# Patient Record
Sex: FEMALE | Race: WHITE | NOT HISPANIC OR LATINO | Employment: OTHER | URBAN - METROPOLITAN AREA
[De-identification: names, ages, dates, MRNs, and addresses within clinical notes are randomized per-mention and may not be internally consistent; named-entity substitution may affect disease eponyms.]

---

## 2019-07-08 ENCOUNTER — TRANSFERRED RECORDS (OUTPATIENT)
Dept: HEALTH INFORMATION MANAGEMENT | Facility: CLINIC | Age: 69
End: 2019-07-08

## 2021-04-05 ENCOUNTER — TRANSFERRED RECORDS (OUTPATIENT)
Dept: HEALTH INFORMATION MANAGEMENT | Facility: CLINIC | Age: 71
End: 2021-04-05

## 2021-07-06 ENCOUNTER — TRANSFERRED RECORDS (OUTPATIENT)
Dept: HEALTH INFORMATION MANAGEMENT | Facility: CLINIC | Age: 71
End: 2021-07-06

## 2021-08-02 ENCOUNTER — MEDICAL CORRESPONDENCE (OUTPATIENT)
Dept: HEALTH INFORMATION MANAGEMENT | Facility: CLINIC | Age: 71
End: 2021-08-02

## 2021-08-05 ENCOUNTER — TRANSCRIBE ORDERS (OUTPATIENT)
Dept: OTHER | Age: 71
End: 2021-08-05

## 2021-08-05 DIAGNOSIS — T83.718A: Primary | ICD-10-CM

## 2021-08-06 NOTE — TELEPHONE ENCOUNTER
"  REFERRAL INFORMATION:    Referring Provider:  Dr. Law Majano    Referring Clinic:  [facility]     Reason for Visit/Diagnosis: Hiatal hernic, Mesh erosion        FUTURE VISIT INFORMATION:    Appointment Date: 8/13/2021    Appointment Time: 7:30 AM      NOTES RECORD STATUS  DETAILS   OFFICE NOTE from Referring Provider Care Everywhere 5/17/2021, 3/26/2021 Office visit with Dr. Majano   OFFICE NOTE from Other Specialists In process    HOSPITAL DISCHARGE SUMMARY/ ED VISITS  N/A    OPERATIVE REPORT In process    ENDOSCOPY (EGD)  Care Everywhere 7/6/2021, 4/5/2021, 3/11/15 ([place])    PERTINENT LABS Care Everywhere    PATHOLOGY REPORTS (RELATED) N/A    IMAGING (CT, MRI, US, XR)  N/A      8/12/2021 4:04pm Called and LVM for Pt. Called to inquire about previous hiatal repairs. -Bhao    8/14/2021 Received message from DENIS Pina:  \"This patient was seen by Dr. Ricardo Brown on 8/13.  He is requesting additional records related to this appointment and I am wondering if you are able to obtain them.  He is asking for the following records:   1.  Operative Reports (related to abdominal/hiatal hernia surery)   2.  Colored pictures from the EGD that was done on 7/2.\"    Sent request to [place] for the 4 op reports and EGD (7-6-21)    CJ  "

## 2021-08-13 ENCOUNTER — PRE VISIT (OUTPATIENT)
Dept: SURGERY | Facility: CLINIC | Age: 71
End: 2021-08-13

## 2021-08-13 ENCOUNTER — OFFICE VISIT (OUTPATIENT)
Dept: SURGERY | Facility: CLINIC | Age: 71
End: 2021-08-13
Payer: MEDICARE

## 2021-08-13 VITALS
HEART RATE: 100 BPM | SYSTOLIC BLOOD PRESSURE: 134 MMHG | DIASTOLIC BLOOD PRESSURE: 77 MMHG | WEIGHT: 176.5 LBS | OXYGEN SATURATION: 96 %

## 2021-08-13 DIAGNOSIS — T83.718A: Primary | ICD-10-CM

## 2021-08-13 PROCEDURE — 99204 OFFICE O/P NEW MOD 45 MIN: CPT | Performed by: SURGERY

## 2021-08-13 RX ORDER — ALBUTEROL SULFATE 90 UG/1
2 AEROSOL, METERED RESPIRATORY (INHALATION) EVERY 6 HOURS
COMMUNITY

## 2021-08-13 RX ORDER — DESMOPRESSIN ACETATE 0.2 MG/1
0.2 TABLET ORAL DAILY
COMMUNITY

## 2021-08-13 RX ORDER — LEVOTHYROXINE SODIUM 88 UG/1
88 TABLET ORAL DAILY
COMMUNITY

## 2021-08-13 ASSESSMENT — ENCOUNTER SYMPTOMS
WHEEZING: 0
HEMATURIA: 0
EXTREMITY NUMBNESS: 0
JOINT SWELLING: 0
POLYPHAGIA: 0
POOR WOUND HEALING: 0
FLANK PAIN: 0
BOWEL INCONTINENCE: 0
WEIGHT LOSS: 0
BACK PAIN: 0
FATIGUE: 0
COUGH: 0
NIGHT SWEATS: 0
SEIZURES: 0
HOARSE VOICE: 0
HEARTBURN: 0
POLYDIPSIA: 0
CLAUDICATION: 0
HYPERTENSION: 0
STIFFNESS: 0
NECK MASS: 0
NERVOUS/ANXIOUS: 0
LEG PAIN: 0
FEVER: 0
WEAKNESS: 0
SINUS PAIN: 0
SNORES LOUDLY: 0
TACHYCARDIA: 0
BLOOD IN STOOL: 0
TINGLING: 0
HYPOTENSION: 0
RECTAL BLEEDING: 0
HEMOPTYSIS: 0
SPEECH CHANGE: 0
DECREASED CONCENTRATION: 0
EYE REDNESS: 0
CONSTIPATION: 0
NAUSEA: 0
MUSCLE CRAMPS: 0
HEADACHES: 0
WEIGHT GAIN: 0
ABDOMINAL PAIN: 0
COUGH DISTURBING SLEEP: 0
DIZZINESS: 0
BRUISES/BLEEDS EASILY: 1
POSTURAL DYSPNEA: 0
SKIN CHANGES: 0
JAUNDICE: 0
TREMORS: 0
HALLUCINATIONS: 0
MEMORY LOSS: 0
BREAST MASS: 0
RECTAL PAIN: 0
ALTERED TEMPERATURE REGULATION: 0
DISTURBANCES IN COORDINATION: 0
EYE WATERING: 0
SHORTNESS OF BREATH: 0
DYSPNEA ON EXERTION: 0
LOSS OF CONSCIOUSNESS: 0
INCREASED ENERGY: 0
MUSCLE WEAKNESS: 0
ARTHRALGIAS: 0
DECREASED LIBIDO: 0
DIARRHEA: 0
LEG SWELLING: 0
TROUBLE SWALLOWING: 0
SORE THROAT: 0
PARALYSIS: 0
DIFFICULTY URINATING: 0
PANIC: 0
DECREASED APPETITE: 0
SINUS CONGESTION: 0
NAIL CHANGES: 0
VOMITING: 0
NUMBNESS: 0
SMELL DISTURBANCE: 0
MYALGIAS: 0
TASTE DISTURBANCE: 0
BREAST PAIN: 0
EXERCISE INTOLERANCE: 0
PALPITATIONS: 0
RESPIRATORY PAIN: 0
SLEEP DISTURBANCES DUE TO BREATHING: 0
INSOMNIA: 0
SYNCOPE: 0
BLOATING: 0
EYE PAIN: 0
SPUTUM PRODUCTION: 0
NECK PAIN: 0
HOT FLASHES: 0
EYE IRRITATION: 0
LIGHT-HEADEDNESS: 0
ORTHOPNEA: 0
DOUBLE VISION: 0
SWOLLEN GLANDS: 0
DYSURIA: 0
CHILLS: 0
DEPRESSION: 0

## 2021-08-13 ASSESSMENT — PAIN SCALES - GENERAL: PAINLEVEL: NO PAIN (0)

## 2021-08-13 NOTE — NURSING NOTE
Chief Complaint   Patient presents with     Consult     Consultation Hernia with abdominal pain       Vitals:    08/13/21 0708   BP: 134/77   BP Location: Left arm   Patient Position: Chair   Cuff Size: Adult Regular   Pulse: 100   SpO2: 96%   Weight: 80.1 kg (176 lb 8 oz)       There is no height or weight on file to calculate BMI.

## 2021-08-13 NOTE — PROGRESS NOTES
New General Surgery Consultation Note        Skylar Quinones  [id number]  1950 August 13, 2021     Requesting Provider: No ref. 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 She is largely asymptomatic from this, however, would like it addressed if possible. I explained that further surgery would be far too risky relative to th benefits. I explained if the mesh was at a focal area of narrowing in the esophagus/stomach that could hold a stent, allowing mesh to migrate may be an option. I also explained some of the mesh can be cut, but likely not enough to make an appreciable difference. We will obtain her op records and photos from EGD. I will discuss this case with my colleageu Cedric Marques to discuss if there is a feasible endoscopic procedure to offer her, or not. W will connect by phone or [facility] in a couple weeks.    Ricardo Brown MD    45 minutes spent on the date of the encounter doing chart review, history and exam, documentation and further activities per the note      HISTORY OF PRESENT ILLNESS:  I had the pleasure of seeing Ms Quinones from [place]. She is a 71F who presents with concern for mesh erosion from prior surgery. Her symptoms are chronic occasional cough. She is able to perform ADL, and stay nourished with a bland diet, and is overall doing well and highly functional.    She has undergone 4 foregut operations between 8250-7684 for which we do not have the records. These include Nissen/HHR  and revisions. Her most recent surgery utilized permanent mesh. She has had intermittent EGD over the last decade. She has healed an ulcer, and is on omeprazole 20mg daily without classic reflux symptoms. However, on her most recent endoscopy, mesh erosion with tacks was noted, which is understandably concerning to Ms Quinones. She presents for an opinion on options to address this.      SEVERITY:   Severity of Present Illness Reviewed With Patient 8/13/2021   How would you describe your symptoms? Severe   Does your current concern alter your level of activities? Yes       TIMING:  Timing of Present Illness Reviewed With Patient 8/13/2021   The onset of my symptoms was: Sudden   My symptoms are: Intermittent (come and go)   My symptoms have been: Improving       DURATION:  No flowsheet data found.     MODIFYING FACTORS:  Modifying Factors Reviewed With Patient 8/13/2021   My symptoms get worse with: coughing eatting   My symptoms improve or resolve with: when using her inhalers           Review of Systems     Constitutional:  Negative for fever, chills, weight loss, weight gain, fatigue, decreased appetite, night sweats, recent stressors, height gain, height loss, post-operative complications, incisional pain, hallucinations, increased energy, hyperactivity and confused.   HENT:  Negative for ear pain, hearing loss, tinnitus, nosebleeds, trouble swallowing, hoarse voice, mouth sores, sore throat, ear discharge, tooth pain, gum tenderness, taste disturbance, smell disturbance, hearing aid, bleeding gums, dry mouth, sinus pain, sinus congestion and neck mass.    Eyes:  Negative for double vision, pain, redness, eye pain, decreased vision, eye watering, eye bulging, eye dryness, flashing lights, spots, floaters, strabismus, tunnel vision, jaundice and eye irritation.   Respiratory:   Negative for cough, hemoptysis, sputum production, shortness of breath, wheezing, sleep disturbances due to breathing, snores loudly,  respiratory pain, dyspnea on exertion, cough disturbing sleep and postural dyspnea.    Cardiovascular:  Negative for chest pain, dyspnea on exertion, palpitations, orthopnea, claudication, leg swelling, fingers/toes turn blue, hypertension, hypotension, syncope, history of heart murmur, chest pain on exertion, chest pain at rest, pacemaker, few scattered varicosities, leg pain, sleep disturbances due to breathing, tachycardia, light-headedness, exercise intolerance and edema.   Gastrointestinal:  Negative for heartburn, nausea, vomiting, abdominal pain, diarrhea, constipation, blood in stool, melena, rectal pain, bloating, hemorrhoids, bowel incontinence, jaundice, rectal bleeding, coffee ground emesis and change in stool.   Genitourinary:  Negative for bladder incontinence, dysuria, urgency, hematuria, flank pain, vaginal discharge, difficulty urinating, genital sores, dyspareunia, decreased libido, nocturia, voiding less frequently, arousal difficulty, abnormal vaginal bleeding, excessive menstruation, menstrual changes, hot flashes, vaginal dryness and postmenopausal bleeding.   Musculoskeletal:  Negative for myalgias, back pain, joint swelling, arthralgias, stiffness, muscle cramps, neck pain, bone pain, muscle weakness and fracture.   Skin:  Negative for nail changes, itching, poor wound healing, rash, hair changes, skin changes, acne, warts, poor wound healing, scarring, flaky skin, Raynaud's phenomenon, sensitivity to sunlight and skin thickening.   Neurological:  Negative for dizziness, tingling, tremors, speech change, seizures, loss of consciousness, weakness, light-headedness, numbness, headaches, disturbances in coordination, extremity numbness, memory loss, difficulty walking and paralysis.   Endo/Heme:  Positive for bruises/bleeds easily. Negative for anemia and swollen glands.   Psychiatric/Behavioral:  Negative for depression, hallucinations, memory loss, decreased concentration, mood swings and  panic attacks.    Breast:  Negative for breast discharge, breast mass, breast pain and nipple retraction.   Endocrine:  Negative for altered temperature regulation, polyphagia, polydipsia, unwanted hair growth and change in facial hair.       Patient Active Problem List   Diagnosis     Hyperlipemia     Hypothyroidism     Panhypopituitarism (HCC)     Diabetes insipidus (HCC)     Osteopenia     Rib pain on left side     Myofacial muscle pain     Post-thoracotomy pain syndrome     Adrenal insufficiency (HCC)     Syncope     Intercostal neuralgia     Abdominal pain, chronic, left upper quadrant     Primary osteoarthritis of left knee     Abnormal CT of the chest     Snoring     Nocturnal hypoxemia     Nasal septal perforation     Requires supplemental oxygen     Reactive airway disease     Immunization History   Administered Date(s) Administered     FLU VACCINE HIGH DOSE 65YR+(Fluzone) 10/25/2017     H1N1 Vaccine 12/16/2009     Influenza Vaccine,unspecified 09/23/2016, 09/18/2018     Pneumococcal Conj PCV13 06/22/2016     Pneumococcal Polysaccharide PPSV23 07/28/2014     TDAP 07/13/2010     Zoster Live(Zostavax) 07/16/2012     Zoster Recombinant (Shingrix) 12/10/2018, 03/14/2019     Past Medical History:   Diagnosis Date     Adrenal insufficiency (HCC)     Disorder of pituitary gland (HCC) 2006   surgery on pituitary gland resulting in cortisone deprivation     GERD (gastroesophageal reflux disease)     Hyperlipidemia     Nasal septal perforation 3/2/2019     Nocturnal hypoxemia     Osteoporosis     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)     Panhypopituitarism (HCC)     Primary osteoarthritis of left knee 4/6/2016     Thyroid disease     Past Surgical History:   Procedure Laterality Date     ARTHRODESIS MINOR Left 7/8/2019   Procedure: First metatarsal phalangeal joint fusion left foot;; Surgeon: Juan Torres DPM     BRAIN SURGERY   related to parathyroid surgery      BUNIONECTOMY Right 10/21/2019   Procedure: Distal first metatarsal osteotomy right foot;; Surgeon: Juan Torres DPM     COLONOSCOPY 08/08/2011     DISCISSION CATARACT LASER SURG     EYE PROCEDURE 09/20/2019   cataract     HERNIA REPAIR   4 hiatal hernia repairs     HYPOPHYSECTOMY EXC PITUITARY TUMR TRANSNASL TRANSSEPTL NONSTEREOTACTIC   Tumor removed from pituitary     RE HIATAL HERNIA LAP WO MES   x4     SKIN LESIONS Left   Removal from leg     TUBAL LIGATION 1981     UPPER ENDOSCOPY N/A 3/11/2015   Procedure: UPPER ENDOSCOPY;; Surgeon: Juan Donnelly MD     MEDICATIONS:  Current Outpatient Medications   Medication     albuterol (PROAIR HFA/PROVENTIL HFA/VENTOLIN HFA) 108 (90 Base) MCG/ACT inhaler     desmopressin (DDAVP) 0.2 MG tablet     fluticasone-salmeterol (ADVAIR) 100-50 MCG/DOSE inhaler     levothyroxine (SYNTHROID/LEVOTHROID) 88 MCG tablet     No current facility-administered medications for this visit.        ALLERGIES:  Allergies   Allergen Reactions     Demerol Hcl [Meperidine] Dizziness     Morphine Dizziness        SOCIAL HISTORY:  Social History     Socioeconomic History     Marital status:      Spouse name: Not on file     Number of children: Not on file     Years of education: Not on file     Highest education level: Not on file   Occupational History     Not on file   Tobacco Use     Smoking status: Not on file   Substance and Sexual Activity     Alcohol use: Not on file     Drug use: Not on file     Sexual activity: Not on file   Other Topics Concern     Not on file   Social History Narrative     Not on file     Social Determinants of Health     Financial Resource Strain:      Difficulty of Paying Living Expenses:    Food Insecurity:      Worried About Running Out of Food in the Last Year:      Ran Out of Food in the Last Year:    Transportation Needs:      Lack of Transportation (Medical):      Lack of Transportation (Non-Medical):    Physical Activity:      Days of Exercise per  Week:      Minutes of Exercise per Session:    Stress:      Feeling of Stress :    Social Connections:      Frequency of Communication with Friends and Family:      Frequency of Social Gatherings with Friends and Family:      Attends Gnosticist Services:      Active Member of Clubs or Organizations:      Attends Club or Organization Meetings:      Marital Status:    Intimate Partner Violence:      Fear of Current or Ex-Partner:      Emotionally Abused:      Physically Abused:      Sexually Abused:        FAMILY HISTORY:  No family history on file.    [facility]    PHYSICAL EXAM:  Objective    /77 (BP Location: Left arm, Patient Position: Chair, Cuff Size: Adult Regular)   Pulse 100   Wt 80.1 kg (176 lb 8 oz)   SpO2 96%   /77 (BP Location: Left arm, Patient Position: Chair, Cuff Size: Adult Regular)   Pulse 100   Wt 80.1 kg (176 lb 8 oz)   SpO2 96%   There is no height or weight on file to calculate BMI.    Well appearing, no acute distress        Sincerely,     Ricardo Brown MD

## 2021-08-13 NOTE — LETTER
8/13/2021       RE: Skylar Quinones  [address]  [place] [address]     Dear Colleague,    Thank you for referring your patient, Skylar Quinones, to the [facility] [place] [facility]. Please see a copy of my visit note below.    New General Surgery Consultation Note        Skylar Quinones  [id number]  1950 August 13, 2021     Requesting Provider: No ref. 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 She is largely asymptomatic from this, however, would like it addressed if possible. I explained that further surgery would be far too risky relative to th benefits. I explained if the mesh was at a focal area of narrowing in the esophagus/stomach that could hold a stent, allowing mesh to migrate may be an option. I also explained some of the mesh can be cut, but likely not enough to make an appreciable difference. We will obtain her op records and photos from EGD. I will discuss this case with my colleageu Cedric Marques to discuss if there is a feasible endoscopic procedure to offer her, or not. W will connect by phone or [email or web address] in a couple weeks.    Ricardo Brown MD    45 minutes spent on the date of the encounter doing chart review, history and exam, documentation and further activities per the note      HISTORY OF PRESENT ILLNESS:  I had the pleasure of seeing Ms Quinones from [place]. She is a 71F who presents with  concern for mesh erosion from prior surgery. Her symptoms are chronic occasional cough. She is able to perform ADL, and stay nourished with a bland diet, and is overall doing well and highly functional.    She has undergone 4 foregut operations between 3783-5628 for which we do not have the records. These include Nissen/HHR and revisions. Her most recent surgery utilized permanent mesh. She has had intermittent EGD over the last decade. She has healed an ulcer, and is on omeprazole 20mg daily without classic reflux symptoms. However, on her most recent endoscopy, mesh erosion with tacks was noted, which is understandably concerning to Ms Quinones. She presents for an opinion on options to address this.      SEVERITY:   Severity of Present Illness Reviewed With Patient 8/13/2021   How would you describe your symptoms? Severe   Does your current concern alter your level of activities? Yes       TIMING:  Timing of Present Illness Reviewed With Patient 8/13/2021   The onset of my symptoms was: Sudden   My symptoms are: Intermittent (come and go)   My symptoms have been: Improving       DURATION:  No flowsheet data found.     MODIFYING FACTORS:  Modifying Factors Reviewed With Patient 8/13/2021   My symptoms get worse with: coughing eatting   My symptoms improve or resolve with: when using her inhalers           Review of Systems     Constitutional:  Negative for fever, chills, weight loss, weight gain, fatigue, decreased appetite, night sweats, recent stressors, height gain, height loss, post-operative complications, incisional pain, hallucinations, increased energy, hyperactivity and confused.   HENT:  Negative for ear pain, hearing loss, tinnitus, nosebleeds, trouble swallowing, hoarse voice, mouth sores, sore throat, ear discharge, tooth pain, gum tenderness, taste disturbance, smell disturbance, hearing aid, bleeding gums, dry mouth, sinus pain, sinus congestion and neck mass.    Eyes:  Negative for double vision,  pain, redness, eye pain, decreased vision, eye watering, eye bulging, eye dryness, flashing lights, spots, floaters, strabismus, tunnel vision, jaundice and eye irritation.   Respiratory:   Negative for cough, hemoptysis, sputum production, shortness of breath, wheezing, sleep disturbances due to breathing, snores loudly, respiratory pain, dyspnea on exertion, cough disturbing sleep and postural dyspnea.    Cardiovascular:  Negative for chest pain, dyspnea on exertion, palpitations, orthopnea, claudication, leg swelling, fingers/toes turn blue, hypertension, hypotension, syncope, history of heart murmur, chest pain on exertion, chest pain at rest, pacemaker, few scattered varicosities, leg pain, sleep disturbances due to breathing, tachycardia, light-headedness, exercise intolerance and edema.   Gastrointestinal:  Negative for heartburn, nausea, vomiting, abdominal pain, diarrhea, constipation, blood in stool, melena, rectal pain, bloating, hemorrhoids, bowel incontinence, jaundice, rectal bleeding, coffee ground emesis and change in stool.   Genitourinary:  Negative for bladder incontinence, dysuria, urgency, hematuria, flank pain, vaginal discharge, difficulty urinating, genital sores, dyspareunia, decreased libido, nocturia, voiding less frequently, arousal difficulty, abnormal vaginal bleeding, excessive menstruation, menstrual changes, hot flashes, vaginal dryness and postmenopausal bleeding.   Musculoskeletal:  Negative for myalgias, back pain, joint swelling, arthralgias, stiffness, muscle cramps, neck pain, bone pain, muscle weakness and fracture.   Skin:  Negative for nail changes, itching, poor wound healing, rash, hair changes, skin changes, acne, warts, poor wound healing, scarring, flaky skin, Raynaud's phenomenon, sensitivity to sunlight and skin thickening.   Neurological:  Negative for dizziness, tingling, tremors, speech change, seizures, loss of consciousness, weakness, light-headedness, numbness,  headaches, disturbances in coordination, extremity numbness, memory loss, difficulty walking and paralysis.   Endo/Heme:  Positive for bruises/bleeds easily. Negative for anemia and swollen glands.   Psychiatric/Behavioral:  Negative for depression, hallucinations, memory loss, decreased concentration, mood swings and panic attacks.    Breast:  Negative for breast discharge, breast mass, breast pain and nipple retraction.   Endocrine:  Negative for altered temperature regulation, polyphagia, polydipsia, unwanted hair growth and change in facial hair.       Patient Active Problem List   Diagnosis     Hyperlipemia     Hypothyroidism     Panhypopituitarism (HCC)     Diabetes insipidus (HCC)     Osteopenia     Rib pain on left side     Myofacial muscle pain     Post-thoracotomy pain syndrome     Adrenal insufficiency (HCC)     Syncope     Intercostal neuralgia     Abdominal pain, chronic, left upper quadrant     Primary osteoarthritis of left knee     Abnormal CT of the chest     Snoring     Nocturnal hypoxemia     Nasal septal perforation     Requires supplemental oxygen     Reactive airway disease     Immunization History   Administered Date(s) Administered     FLU VACCINE HIGH DOSE 65YR+(Fluzone) 10/25/2017     H1N1 Vaccine 12/16/2009     Influenza Vaccine,unspecified 09/23/2016, 09/18/2018     Pneumococcal Conj PCV13 06/22/2016     Pneumococcal Polysaccharide PPSV23 07/28/2014     TDAP 07/13/2010     Zoster Live(Zostavax) 07/16/2012     Zoster Recombinant (Shingrix) 12/10/2018, 03/14/2019     Past Medical History:   Diagnosis Date     Adrenal insufficiency (HCC)     Disorder of pituitary gland (HCC) 2006   surgery on pituitary gland resulting in cortisone deprivation     GERD (gastroesophageal reflux disease)     Hyperlipidemia     Nasal septal perforation 3/2/2019     Nocturnal hypoxemia     Osteoporosis     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)     Panhypopituitarism (HCC)      Primary osteoarthritis of left knee 4/6/2016     Thyroid disease     Past Surgical History:   Procedure Laterality Date     ARTHRODESIS MINOR Left 7/8/2019   Procedure: First metatarsal phalangeal joint fusion left foot;; Surgeon: Juan Torres DPM     BRAIN SURGERY   related to parathyroid surgery     BUNIONECTOMY Right 10/21/2019   Procedure: Distal first metatarsal osteotomy right foot;; Surgeon: Juan Torres DPM     COLONOSCOPY 08/08/2011     DISCISSION CATARACT LASER SURG     EYE PROCEDURE 09/20/2019   cataract     HERNIA REPAIR   4 hiatal hernia repairs     HYPOPHYSECTOMY EXC PITUITARY TUMR TRANSNASL TRANSSEPTL NONSTEREOTACTIC   Tumor removed from pituitary     RE HIATAL HERNIA LAP WO MES   x4     SKIN LESIONS Left   Removal from leg     TUBAL LIGATION 1981     UPPER ENDOSCOPY N/A 3/11/2015   Procedure: UPPER ENDOSCOPY;; Surgeon: Juan Donnelly MD     MEDICATIONS:  Current Outpatient Medications   Medication     albuterol (PROAIR HFA/PROVENTIL HFA/VENTOLIN HFA) 108 (90 Base) MCG/ACT inhaler     desmopressin (DDAVP) 0.2 MG tablet     fluticasone-salmeterol (ADVAIR) 100-50 MCG/DOSE inhaler     levothyroxine (SYNTHROID/LEVOTHROID) 88 MCG tablet     No current facility-administered medications for this visit.        ALLERGIES:  Allergies   Allergen Reactions     Demerol Hcl [Meperidine] Dizziness     Morphine Dizziness        SOCIAL HISTORY:  Social History     Socioeconomic History     Marital status:      Spouse name: Not on file     Number of children: Not on file     Years of education: Not on file     Highest education level: Not on file   Occupational History     Not on file   Tobacco Use     Smoking status: Not on file   Substance and Sexual Activity     Alcohol use: Not on file     Drug use: Not on file     Sexual activity: Not on file   Other Topics Concern     Not on file   Social History Narrative     Not on file     Social Determinants of Health     Financial Resource Strain:       Difficulty of Paying Living Expenses:    Food Insecurity:      Worried About Running Out of Food in the Last Year:      Ran Out of Food in the Last Year:    Transportation Needs:      Lack of Transportation (Medical):      Lack of Transportation (Non-Medical):    Physical Activity:      Days of Exercise per Week:      Minutes of Exercise per Session:    Stress:      Feeling of Stress :    Social Connections:      Frequency of Communication with Friends and Family:      Frequency of Social Gatherings with Friends and Family:      Attends Confucianist Services:      Active Member of Clubs or Organizations:      Attends Club or Organization Meetings:      Marital Status:    Intimate Partner Violence:      Fear of Current or Ex-Partner:      Emotionally Abused:      Physically Abused:      Sexually Abused:        FAMILY HISTORY:  No family history on file.    EGD reports reviewed on [facility]    PHYSICAL EXAM:  Objective    /77 (BP Location: Left arm, Patient Position: Chair, Cuff Size: Adult Regular)   Pulse 100   Wt 80.1 kg (176 lb 8 oz)   SpO2 96%   /77 (BP Location: Left arm, Patient Position: Chair, Cuff Size: Adult Regular)   Pulse 100   Wt 80.1 kg (176 lb 8 oz)   SpO2 96%   There is no height or weight on file to calculate BMI.    Well appearing, no acute distress      Sincerely,     Ricardo Brown MD

## 2021-08-22 ENCOUNTER — HEALTH MAINTENANCE LETTER (OUTPATIENT)
Age: 71
End: 2021-08-22

## 2021-08-24 ENCOUNTER — PATIENT OUTREACH (OUTPATIENT)
Dept: SURGERY | Facility: CLINIC | Age: 71
End: 2021-08-24

## 2021-08-24 NOTE — PROGRESS NOTES
Message sent to the records retrieval team for General Surgery Team.  Requesting help obtaining Operative Reports from abdominal/hiatal hernia surgery and color photos of EGD that was done OSH 7/2/21.    Await response.

## 2021-08-31 ENCOUNTER — TELEPHONE (OUTPATIENT)
Dept: ENDOCRINOLOGY | Facility: CLINIC | Age: 71
End: 2021-08-31

## 2021-08-31 NOTE — TELEPHONE ENCOUNTER
[facility] [address] [facility] [place] [id number]   [phone]  Fax [phone]  Fax [phone]    Sent to archive and scan into pt chart with note

## 2021-10-17 ENCOUNTER — HEALTH MAINTENANCE LETTER (OUTPATIENT)
Age: 71
End: 2021-10-17

## 2022-10-03 ENCOUNTER — HEALTH MAINTENANCE LETTER (OUTPATIENT)
Age: 72
End: 2022-10-03

## 2023-10-21 ENCOUNTER — HEALTH MAINTENANCE LETTER (OUTPATIENT)
Age: 73
End: 2023-10-21